# Patient Record
Sex: MALE | Race: AMERICAN INDIAN OR ALASKA NATIVE
[De-identification: names, ages, dates, MRNs, and addresses within clinical notes are randomized per-mention and may not be internally consistent; named-entity substitution may affect disease eponyms.]

---

## 2019-03-27 ENCOUNTER — HOSPITAL ENCOUNTER (EMERGENCY)
Dept: HOSPITAL 5 - ED | Age: 29
Discharge: HOME | End: 2019-03-27
Payer: SELF-PAY

## 2019-03-27 VITALS — DIASTOLIC BLOOD PRESSURE: 35 MMHG | SYSTOLIC BLOOD PRESSURE: 136 MMHG

## 2019-03-27 DIAGNOSIS — J02.0: Primary | ICD-10-CM

## 2019-03-27 DIAGNOSIS — F12.10: ICD-10-CM

## 2019-03-27 DIAGNOSIS — F17.200: ICD-10-CM

## 2019-03-27 DIAGNOSIS — Z79.899: ICD-10-CM

## 2019-03-27 PROCEDURE — 96372 THER/PROPH/DIAG INJ SC/IM: CPT

## 2019-03-27 PROCEDURE — 99282 EMERGENCY DEPT VISIT SF MDM: CPT

## 2019-03-27 NOTE — EMERGENCY DEPARTMENT REPORT
ED ENT HPI





- General


Chief complaint: Sore Throat


Stated complaint: SORE THROAT


Time Seen by Provider: 03/27/19 11:04


Source: patient


Mode of arrival: Ambulatory


Limitations: No Limitations





- History of Present Illness


Initial comments: 





Patient is a 28-year-old male who presents to ED complaining of sore 

throat,patient states that his girlfriend was seen here 2 days ago was treated 

for strep pharyngitis.  Patient states that he's been taking over-the-counter 

medication with minimal relief.  He states that he came to the ED to be 

evaluated


MD complaint: sore throat





- Related Data


                                  Previous Rx's











 Medication  Instructions  Recorded  Last Taken  Type


 


Cyclobenzaprine HCl [Flexeril] 10 mg PO TID PRN #15 tablet 01/19/14 Unknown Rx


 


Naproxen [Naprosyn TAB] 500 mg PO BID #20 tablet 12/08/14 Unknown Rx


 


Sulfamethoxazole/Trimethoprim 1 each PO BID #14 tablet 12/08/14 Unknown Rx





[Bactrim Ds]    


 


Ibuprofen [Motrin 800 MG tab] 800 mg PO TID PRN #30 tablet 03/27/19 Unknown Rx


 


Nystas/Diphen/Xyl Visc/Mylanta 15 ml PO TID PRN #120 ml 03/27/19 Unknown Rx





[Magic Mouthwash]    











                                    Allergies











Allergy/AdvReac Type Severity Reaction Status Date / Time


 


No Known Allergies Allergy   Unverified 01/19/14 11:34














ED Dental HPI





- General


Chief complaint: Sore Throat


Stated complaint: SORE THROAT


Time Seen by Provider: 03/27/19 11:04


Source: patient


Mode of arrival: Ambulatory


Limitations: No Limitations





- Related Data


                                  Previous Rx's











 Medication  Instructions  Recorded  Last Taken  Type


 


Cyclobenzaprine HCl [Flexeril] 10 mg PO TID PRN #15 tablet 01/19/14 Unknown Rx


 


Naproxen [Naprosyn TAB] 500 mg PO BID #20 tablet 12/08/14 Unknown Rx


 


Sulfamethoxazole/Trimethoprim 1 each PO BID #14 tablet 12/08/14 Unknown Rx





[Bactrim Ds]    


 


Ibuprofen [Motrin 800 MG tab] 800 mg PO TID PRN #30 tablet 03/27/19 Unknown Rx


 


Nystas/Diphen/Xyl Visc/Mylanta 15 ml PO TID PRN #120 ml 03/27/19 Unknown Rx





[Magic Mouthwash]    











                                    Allergies











Allergy/AdvReac Type Severity Reaction Status Date / Time


 


No Known Allergies Allergy   Unverified 01/19/14 11:34














ED Review of Systems


ROS: 


Stated complaint: SORE THROAT


Other details as noted in HPI





Comment: All other systems reviewed and negative





ED Past Medical Hx





- Past Medical History


Previous Medical History?: No





- Surgical History


Past Surgical History?: No





- Social History


Smoking Status: Current Every Day Smoker


Substance Use Type: Marijuana





- Medications


Home Medications: 


                                Home Medications











 Medication  Instructions  Recorded  Confirmed  Last Taken  Type


 


Cyclobenzaprine HCl [Flexeril] 10 mg PO TID PRN #15 tablet 01/19/14  Unknown Rx


 


Naproxen [Naprosyn TAB] 500 mg PO BID #20 tablet 12/08/14  Unknown Rx


 


Sulfamethoxazole/Trimethoprim 1 each PO BID #14 tablet 12/08/14  Unknown Rx





[Bactrim Ds]     


 


Ibuprofen [Motrin 800 MG tab] 800 mg PO TID PRN #30 tablet 03/27/19  Unknown Rx


 


Nystas/Diphen/Xyl Visc/Mylanta 15 ml PO TID PRN #120 ml 03/27/19  Unknown Rx





[Magic Mouthwash]     














ED Physical Exam





- General


Limitations: No Limitations


General appearance: alert, in no apparent distress





- Head


Head exam: Present: atraumatic, normocephalic





- Eye


Eye exam: Present: normal appearance





- ENT


ENT exam: Present: mucous membranes moist, TM's normal bilaterally





- Neck


Neck exam: Present: normal inspection, full ROM, lymphadenopathy.  Absent: 

tenderness





- Respiratory


Respiratory exam: Present: normal lung sounds bilaterally.  Absent: respiratory 

distress





- Cardiovascular


Cardiovascular Exam: Present: regular rate, normal rhythm.  Absent: systolic 

murmur, diastolic murmur, rubs, gallop





- GI/Abdominal


GI/Abdominal exam: Present: soft, normal bowel sounds





- Rectal


Rectal exam: Present: deferred





- Extremities Exam


Extremities exam: Present: normal inspection





- Back Exam


Back exam: Present: normal inspection





- Neurological Exam


Neurological exam: Present: alert, oriented X3





- Psychiatric


Psychiatric exam: Present: normal affect, normal mood





- Skin


Skin exam: Present: warm, dry, intact, normal color.  Absent: rash





ED Course





                                   Vital Signs











  03/27/19 03/27/19





  09:45 11:14


 


Temperature 98.2 F 


 


Pulse Rate 76 


 


Respiratory 16 16





Rate  


 


Blood Pressure 136/35 


 


O2 Sat by Pulse 99 





Oximetry  














ED Medical Decision Making





- Medical Decision Making





28-year-old male presents with strep pharyngitis.


ED course: Since girlfriend was recently treated for strep going to to treat 

patient with penicillin


Patient received 1.2 million units of penicillin,


Fever responsive to one dose of Tylenol.


Vital signs stable patient is in no acute or respiratory distress.


Discussed findings with patient about the positive strep.  Discussed treatment i

n ED with patient


Discussed the patient that strep throat is contagious and to limit sharing 

spoons and such.


Application to be sent home on Motrin and a couple of days worth of prednisone.


Discussed with patient follow-up with primary care physician.


Patient verbally states he understands and will comply to follow-up.


Critical care attestation.: 


If time is entered above; I have spent that time in minutes in the direct care 

of this critically ill patient, excluding procedure time.








ED Disposition


Clinical Impression: 


 Strep pharyngitis





Disposition: DC-01 TO HOME OR SELFCARE


Is pt being admited?: No


Does the pt Need Aspirin: No


Condition: Stable


Instructions:  Strep Throat (ED), Tonsillitis (ED)


Additional Instructions: 


Make sure to follow up with the primary care physician as discussed.


Take all your medications as you've been prescribed.


If you have any worsening symptoms or develop new symptoms please return to ED 

immediately.


Prescriptions: 


Nystas/Diphen/Xyl Visc/Mylanta [Magic Mouthwash] 15 ml PO TID PRN #120 ml


 PRN Reason: Sore Throat


Ibuprofen [Motrin 800 MG tab] 800 mg PO TID PRN #30 tablet


 PRN Reason: Pain


Referrals: 


[facility], [place] [Primary Care Provider] - 3-5 Days


Forms:  Work/School Release Form(ED)


Time of Disposition: 12:06